# Patient Record
Sex: MALE | Race: WHITE | URBAN - METROPOLITAN AREA
[De-identification: names, ages, dates, MRNs, and addresses within clinical notes are randomized per-mention and may not be internally consistent; named-entity substitution may affect disease eponyms.]

---

## 2017-04-22 ENCOUNTER — APPOINTMENT (OUTPATIENT)
Dept: GENERAL RADIOLOGY | Facility: CLINIC | Age: 47
End: 2017-04-22
Attending: EMERGENCY MEDICINE
Payer: COMMERCIAL

## 2017-04-22 ENCOUNTER — HOSPITAL ENCOUNTER (EMERGENCY)
Facility: CLINIC | Age: 47
Discharge: HOME | End: 2017-04-22
Attending: EMERGENCY MEDICINE | Admitting: EMERGENCY MEDICINE
Payer: COMMERCIAL

## 2017-04-22 VITALS
HEIGHT: 73 IN | DIASTOLIC BLOOD PRESSURE: 97 MMHG | WEIGHT: 190 LBS | RESPIRATION RATE: 16 BRPM | HEART RATE: 102 BPM | TEMPERATURE: 98.3 F | SYSTOLIC BLOOD PRESSURE: 148 MMHG | BODY MASS INDEX: 25.18 KG/M2 | OXYGEN SATURATION: 98 %

## 2017-04-22 DIAGNOSIS — S70.01XA HIP HEMATOMA, RIGHT, INITIAL ENCOUNTER: ICD-10-CM

## 2017-04-22 DIAGNOSIS — S80.811A: ICD-10-CM

## 2017-04-22 DIAGNOSIS — S50.311A ABRASION OF ELBOW, RIGHT, INITIAL ENCOUNTER: ICD-10-CM

## 2017-04-22 PROCEDURE — 99283 EMERGENCY DEPT VISIT LOW MDM: CPT | Mod: 25

## 2017-04-22 PROCEDURE — 90471 IMMUNIZATION ADMIN: CPT

## 2017-04-22 PROCEDURE — 25000132 ZZH RX MED GY IP 250 OP 250 PS 637: Performed by: EMERGENCY MEDICINE

## 2017-04-22 PROCEDURE — 73502 X-RAY EXAM HIP UNI 2-3 VIEWS: CPT

## 2017-04-22 RX ORDER — HYDROCODONE BITARTRATE AND ACETAMINOPHEN 5; 325 MG/1; MG/1
1-2 TABLET ORAL EVERY 4 HOURS PRN
Qty: 15 TABLET | Refills: 0 | Status: SHIPPED | OUTPATIENT
Start: 2017-04-22 | End: 2022-07-21

## 2017-04-22 RX ORDER — HYDROCODONE BITARTRATE AND ACETAMINOPHEN 5; 325 MG/1; MG/1
1-2 TABLET ORAL ONCE
Status: COMPLETED | OUTPATIENT
Start: 2017-04-22 | End: 2017-04-22

## 2017-04-22 RX ORDER — IBUPROFEN 600 MG/1
600 TABLET, FILM COATED ORAL ONCE
Status: DISCONTINUED | OUTPATIENT
Start: 2017-04-22 | End: 2017-04-22

## 2017-04-22 RX ORDER — GINSENG 100 MG
CAPSULE ORAL
Status: DISCONTINUED
Start: 2017-04-22 | End: 2017-04-22 | Stop reason: HOSPADM

## 2017-04-22 RX ADMIN — HYDROCODONE BITARTRATE AND ACETAMINOPHEN 1 TABLET: 5; 325 TABLET ORAL at 18:01

## 2017-04-22 ASSESSMENT — ENCOUNTER SYMPTOMS: WOUND: 1

## 2017-04-22 NOTE — ED AVS SNAPSHOT
[facility]    [address]    [facility] [address]    Phone:  [phone]    Fax:  [phone]                                       Eran Abraham   MRN: [id number]    Department:  [facility]   Date of Visit:  4/22/2017           After Visit Summary Signature Page     I have received my discharge instructions, and my questions have been answered. I have discussed any challenges I see with this plan with the nurse or doctor.    ..........................................................................................................................................  Patient/Patient Representative Signature      ..........................................................................................................................................  Patient Representative Print Name and Relationship to Patient    ..................................................               ................................................  Date                                            Time    ..........................................................................................................................................  Reviewed by Signature/Title    ...................................................              ..............................................  Date                                                            Time
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
## 2017-04-23 NOTE — DISCHARGE INSTRUCTIONS
Soft Tissue Contusion  You have a contusion. This is also called a bruise. There is swelling and some bleeding under the skin. This injury generally takes a few days to a few weeks to heal.  During that time, the bruise will typically change in color from reddish, to purple-blue, to greenish-yellow, then to yellow-brown.  Home care    Elevate the injured area to reduce pain and swelling. As much as possible, sit or lie down with the injured area raised about the level of your heart. This is especially important during the first 48 hours.    Ice the injured area to help reduce pain and swelling. Wrap a cold source (ice pack or ice cubes in a plastic bag) in a thin towel. Apply to the bruised area for 20 minutes every 1 to 2 hours the first day. Continue this 3 to 4 times a day until the pain and swelling goes away.    Unless another medication was prescribed, you can take acetaminophen, ibuprofen, or naproxen to control pain. (If you have chronic liver or kidney disease or ever had a stomach ulcer or GI bleeding, talk with your doctor before using these medicines.)  Follow up  Follow up with your health care provider or our staff as advised. Call if you are not better in 1 to 2 weeks.  When to seek medical advice   Call your health care provider right away if you have any of the following:    Increased pain or swelling    Bruise is on an arm or leg and arm or leg becomes cold, blue, numb or tingly    Signs of infection: Warmth, drainage, or increased redness or pain around the contusion    Inability to move the injured area or body part     Bruise is near your eye and you have problems with your eyesight or eye     Frequent bruising for unknown reasons    6067-2331 [employer]. [address] [place] [address]. All rights reserved. This information is not intended as a substitute for professional medical care. Always follow your healthcare professional's instructions.

## 2017-06-28 ENCOUNTER — MYC MEDICAL ADVICE (OUTPATIENT)
Dept: SLEEP MEDICINE | Facility: CLINIC | Age: 47
End: 2017-06-28

## 2017-06-28 NOTE — TELEPHONE ENCOUNTER
From: Eran Abraham  To: Teddy Wayne MD  Sent: 6/28/2017 7:53 AM CDT  Subject: Do I need an appointment?    Dr. Espinal,    I visited with you in early 2016 and after an in home sleep study was diagnosed with sleep apnea. I began using the prescribed CPAP device at that time. I recently attempted to get replacement accessories for my CPAP machine and was told that insurance would not cover it without an updated prescription. Apparently that is required annually.    I have been using my CPAP consistently, every night and have to say it has been working well for me. My wife agrees. I plan to continue to use it indefinitely.    What is involved in getting an updated prescription? Thanks.    Eran Jeong

## 2017-06-30 ENCOUNTER — OFFICE VISIT (OUTPATIENT)
Dept: SLEEP MEDICINE | Facility: CLINIC | Age: 47
End: 2017-06-30
Payer: COMMERCIAL

## 2017-06-30 VITALS
WEIGHT: 190 LBS | RESPIRATION RATE: 12 BRPM | HEART RATE: 61 BPM | SYSTOLIC BLOOD PRESSURE: 117 MMHG | BODY MASS INDEX: 25.18 KG/M2 | OXYGEN SATURATION: 98 % | DIASTOLIC BLOOD PRESSURE: 67 MMHG | HEIGHT: 73 IN

## 2017-06-30 DIAGNOSIS — G47.33 OBSTRUCTIVE SLEEP APNEA: Primary | ICD-10-CM

## 2017-06-30 PROCEDURE — 99214 OFFICE O/P EST MOD 30 MIN: CPT | Performed by: INTERNAL MEDICINE

## 2017-06-30 NOTE — PATIENT INSTRUCTIONS
Your blood pressure was checked while you were in clinic today.  Please read the guidelines below about what these numbers mean and what you should do about them.  Your systolic blood pressure is the top number.  This is the pressure when the heart is pumping.  Your diastolic blood pressure is the bottom number.  This is the pressure in between beats.  If your systolic blood pressure is less than 120 and your diastolic blood pressure is less than 80, then your blood pressure is normal. There is nothing more that you need to do about it  If your systolic blood pressure is 120-139 or your diastolic blood pressure is 80-89, your blood pressure may be higher than it should be.  You should have your blood pressure re-checked within a year by a primary care provider.  If your systolic blood pressure is 140 or greater or your diastolic blood pressure is 90 or greater, you may have high blood pressure.  High blood pressure is treatable, but if left untreated over time it can put you at risk for heart attack, stroke, or kidney failure.  You should have your blood pressure re-checked by a primary care provider within the next four weeks.

## 2017-06-30 NOTE — NURSING NOTE
"Chief Complaint   Patient presents with     RECHECK     annual pap        Initial /67  Pulse 61  Resp 12  Ht 1.852 m (6' 0.9\")  Wt 86.2 kg (190 lb)  SpO2 98%  BMI 25.14 kg/m2 Estimated body mass index is 25.14 kg/(m^2) as calculated from the following:    Height as of this encounter: 1.852 m (6' 0.9\").    Weight as of this encounter: 86.2 kg (190 lb).  Medication Reconciliation: complete     Tegan Florian CNA,   "

## 2017-06-30 NOTE — PROGRESS NOTES
45 y/o with severe obstructive sleep apnea with apnea/hypopnea index of 44 without supine predominance or hypoxemia. Patient currently has less afternoon sleepiness and easier to arise in morning on aCPAP 5-15 with AHI < 1 and no leaking with 7.6 hours of sleep/CPAP use and 98% of days > 4 hours.       Severe obstructive sleep apnea effectively treated with CPAP with improvement in subjective symptoms.     Total time 25 minutes face to face, greater than 50% counseling and coordination of care outlined above and in educational materials detailed in instructions.

## 2017-06-30 NOTE — MR AVS SNAPSHOT
After Visit Summary   6/30/2017    Eran Abraham    MRN: [id number]           Patient Information     Date Of Birth          1970        Visit Information        Provider Department      6/30/2017 3:30 PM Teddy Wayne MD [facility]        Today's Diagnoses     Obstructive sleep apnea    -  1      Care Instructions    Your blood pressure was checked while you were in clinic today.  Please read the guidelines below about what these numbers mean and what you should do about them.  Your systolic blood pressure is the top number.  This is the pressure when the heart is pumping.  Your diastolic blood pressure is the bottom number.  This is the pressure in between beats.  If your systolic blood pressure is less than 120 and your diastolic blood pressure is less than 80, then your blood pressure is normal. There is nothing more that you need to do about it  If your systolic blood pressure is 120-139 or your diastolic blood pressure is 80-89, your blood pressure may be higher than it should be.  You should have your blood pressure re-checked within a year by a primary care provider.  If your systolic blood pressure is 140 or greater or your diastolic blood pressure is 90 or greater, you may have high blood pressure.  High blood pressure is treatable, but if left untreated over time it can put you at risk for heart attack, stroke, or kidney failure.  You should have your blood pressure re-checked by a primary care provider within the next four weeks.          Follow-ups after your visit        Who to contact     If you have questions or need follow up information about today's clinic visit or your schedule please contact [facility] directly at [phone].  Normal or non-critical lab and imaging results will be communicated to you by MyChart, letter or phone within 4 business days after the clinic has received the results. If you do not hear from us within  "7 days, please contact the clinic through [employer] or phone. If you have a critical or abnormal lab result, we will notify you by phone as soon as possible.  Submit refill requests through [employer]Juice or call your pharmacy and they will forward the refill request to us. Please allow 3 business days for your refill to be completed.          Additional Information About Your Visit        [employer] Information     [employer] gives you secure access to your electronic health record. If you see a primary care provider, you can also send messages to your care team and make appointments. If you have questions, please call your primary care clinic.  If you do not have a primary care provider, please call [phone] and they will assist you.        Care EveryWhere ID     This is your Care EveryWhere ID. This could be used by other organizations to access your [facility] medical records  [id number]        Your Vitals Were     Pulse Respirations Height Pulse Oximetry BMI (Body Mass Index)       61 12 1.852 m (6' 0.9\") 98% 25.14 kg/m2        Blood Pressure from Last 3 Encounters:   06/30/17 117/67   04/22/17 (!) 148/97   03/11/16 117/76    Weight from Last 3 Encounters:   06/30/17 86.2 kg (190 lb)   04/22/17 86.2 kg (190 lb)   03/11/16 86.2 kg (190 lb)              Today, you had the following     No orders found for display       Primary Care Provider    Bfp None       No address on file        Equal Access to Services     [facility]: Hadii aad ku hadasho Soomaali, waaxda luqadaha, qaybta kaalmada adeegyada, analilia munoz . So [facility] [phone].    ATENCIÓN: Si habla español, tiene a resendez disposición servicios gratuitos de asistencia lingüística. Llame al [phone].    We comply with applicable federal civil rights laws and [place] laws. We do not discriminate on the basis of race, color, national origin, age, disability sex, sexual orientation or gender identity.            Thank you!     Thank you " for choosing [facility]  for your care. Our goal is always to provide you with excellent care. Hearing back from our patients is one way we can continue to improve our services. Please take a few minutes to complete the written survey that you may receive in the mail after your visit with us. Thank you!             Your Updated Medication List - Protect others around you: Learn how to safely use, store and throw away your medicines at www.disposemymeds.org.          This list is accurate as of: 6/30/17  3:58 PM.  Always use your most recent med list.                   Brand Name Dispense Instructions for use Diagnosis    calcium polycarbophil 625 MG tablet    FIBERCON     Take 1 tablet by mouth daily        HYDROcodone-acetaminophen 5-325 MG per tablet    NORCO    15 tablet    Take 1-2 tablets by mouth every 4 hours as needed for pain        loratadine 10 MG tablet    CLARITIN     Take 10 mg by mouth daily        multivitamin, therapeutic Tabs tablet      Take 1 tablet by mouth daily        testosterone 5.5 MG/ACT Gel    NATESTO     Spray 10 mg into both nostrils daily

## 2019-09-27 ENCOUNTER — HEALTH MAINTENANCE LETTER (OUTPATIENT)
Age: 49
End: 2019-09-27

## 2021-01-09 ENCOUNTER — HEALTH MAINTENANCE LETTER (OUTPATIENT)
Age: 51
End: 2021-01-09

## 2021-10-23 ENCOUNTER — HEALTH MAINTENANCE LETTER (OUTPATIENT)
Age: 51
End: 2021-10-23

## 2022-02-12 ENCOUNTER — HEALTH MAINTENANCE LETTER (OUTPATIENT)
Age: 52
End: 2022-02-12

## 2022-07-21 ENCOUNTER — OFFICE VISIT (OUTPATIENT)
Dept: SLEEP MEDICINE | Facility: CLINIC | Age: 52
End: 2022-07-21
Payer: COMMERCIAL

## 2022-07-21 VITALS
WEIGHT: 204.2 LBS | HEIGHT: 73 IN | DIASTOLIC BLOOD PRESSURE: 79 MMHG | SYSTOLIC BLOOD PRESSURE: 128 MMHG | RESPIRATION RATE: 16 BRPM | OXYGEN SATURATION: 96 % | BODY MASS INDEX: 27.06 KG/M2 | HEART RATE: 94 BPM

## 2022-07-21 DIAGNOSIS — G47.33 OSA (OBSTRUCTIVE SLEEP APNEA): Primary | ICD-10-CM

## 2022-07-21 PROBLEM — H90.3 BILATERAL SENSORINEURAL HEARING LOSS: Status: ACTIVE | Noted: 2017-10-18

## 2022-07-21 PROCEDURE — 99203 OFFICE O/P NEW LOW 30 MIN: CPT | Performed by: PHYSICIAN ASSISTANT

## 2022-07-21 NOTE — PATIENT INSTRUCTIONS

## 2022-07-21 NOTE — NURSING NOTE
"Chief Complaint   Patient presents with     Sleep Problem     Annual danuta follow up        Initial /79   Pulse 94   Resp 16   Ht 1.854 m (6' 1\")   Wt 92.6 kg (204 lb 3.2 oz)   SpO2 96%   BMI 26.94 kg/m   Estimated body mass index is 26.94 kg/m  as calculated from the following:    Height as of this encounter: 1.854 m (6' 1\").    Weight as of this encounter: 92.6 kg (204 lb 3.2 oz).    Medication Reconciliation: complete    ESS 6  ANABEL 2  Day Gibbs MA      "

## 2022-07-21 NOTE — PROGRESS NOTES
CPAP Follow-Up Visit:    Date on this visit: 7/21/2022    Eran Abraham has a follow-up visit today to review his CPAP use for SHAHNAZ.   PMHx: allergic rhinitis, low testosterone.    Previous Study Results:   HST 3/9/16  apnea/hypopnea index of 44 without supine predominance or hypoxemia. O2 kae 86%. Wt 190#      He has no problems with the CPAP. He uses it nightly.     PAP machine: ResMed. Pressure settings: 5-15 cm. He gets supplies online. DME was [facility]     The compliance data shows that the patient used the CPAP for 30/30 nights, 100% of nights for >4 hours.  The 95th% pressure is 14.2 cm.  The 95th% leak is 22.9 lpm.  The average nightly usage is 8:04.  The average AHI is 0.9/hr.       Interface:  Mask: Nuance Pro nasal pillows mask  Chin strap: No  Leak: No  Using Humidifier: Yes, does not run out  Condensation in hose or mask: No     Difficulties with therapy:    [-] Snoring with CPAP:   [-] Difficulty tolerating the pressure:  [-] Epistaxis/dry nose:   [-] Nasal congestion:  [-] Dry mouth:  [-] Mouth breathing:   [-] Pain/skin breakdown:       Improvements noted with CPAP:  no more sore throat or snoring. Better energy and fewer awakenings.  [+] waking up more refreshed  [+] sleeping better with less arousals  [+] nocturia improved   [+] improved energy level during the day    Weight change since sleep study: 204 lbs, up about 15# in the last 5 years    Bedtime: 10:45-11:15 PM.  Wake time: 6:30-7:30 AM. Falls asleep in 1-2 minutes. Wakes 0 times per night, usually. Lately, he has woken from dreams  Naps: 0 times.       Past medical/surgical history, family history, social history, medications and allergies were reviewed.      Problem List:  Patient Active Problem List    Diagnosis Date Noted     Allergic rhinitis due to pollen 01/29/2016     Priority: Medium     Low testosterone 01/29/2016     Priority: Medium        Impression/Plan:    (G47.33) SHAHNAZ (obstructive sleep apnea)  (primary encounter  diagnosis)  Comment: Eran is doing very well with CPAP. He uses it regularly and benefits from use. He gets supplies online, machine was from [facility].  Plan: Comprehensive DME        Continue auto CPAP 5-15 cm. A prescription was written for new supplies. We reviewed recommendations for cleaning and replacing supplies.        He will follow up with me in about 2 year(s).     19 minutes were spent on the date of the encounter doing chart review, history and exam, documentation and further activities as noted above.     Bennett Goltz, PA-C    CC: No ref. provider found

## 2022-10-09 ENCOUNTER — HEALTH MAINTENANCE LETTER (OUTPATIENT)
Age: 52
End: 2022-10-09

## 2023-03-25 ENCOUNTER — HEALTH MAINTENANCE LETTER (OUTPATIENT)
Age: 53
End: 2023-03-25

## 2023-09-27 ASSESSMENT — SLEEP AND FATIGUE QUESTIONNAIRES
HOW LIKELY ARE YOU TO NOD OFF OR FALL ASLEEP WHILE SITTING AND READING: SLIGHT CHANCE OF DOZING
HOW LIKELY ARE YOU TO NOD OFF OR FALL ASLEEP WHILE SITTING QUIETLY AFTER LUNCH WITHOUT ALCOHOL: SLIGHT CHANCE OF DOZING
HOW LIKELY ARE YOU TO NOD OFF OR FALL ASLEEP WHILE SITTING AND TALKING TO SOMEONE: WOULD NEVER DOZE
HOW LIKELY ARE YOU TO NOD OFF OR FALL ASLEEP WHEN YOU ARE A PASSENGER IN A CAR FOR AN HOUR WITHOUT A BREAK: SLIGHT CHANCE OF DOZING
HOW LIKELY ARE YOU TO NOD OFF OR FALL ASLEEP WHILE LYING DOWN TO REST IN THE AFTERNOON WHEN CIRCUMSTANCES PERMIT: MODERATE CHANCE OF DOZING
HOW LIKELY ARE YOU TO NOD OFF OR FALL ASLEEP WHILE WATCHING TV: WOULD NEVER DOZE
HOW LIKELY ARE YOU TO NOD OFF OR FALL ASLEEP WHILE SITTING INACTIVE IN A PUBLIC PLACE: WOULD NEVER DOZE
HOW LIKELY ARE YOU TO NOD OFF OR FALL ASLEEP IN A CAR, WHILE STOPPED FOR A FEW MINUTES IN TRAFFIC: WOULD NEVER DOZE

## 2023-09-27 NOTE — PROGRESS NOTES
CPAP Follow-Up Visit:    Date on this visit: 9/28/2023    Eran Abraham has a follow-up visit today to review his CPAP use for SHANHAZ.   PMHx: allergic rhinitis, low testosterone.     Previous Study Results:   HST 3/9/16  apnea/hypopnea index of 44 without supine predominance or hypoxemia. O2 kae 86%. Wt 190#     Eran presents to get a prescription for a new machine. His machine is reading a message that the expected motor life has been exceeded. He definitely notices poorer sleep and energy when he can't use CPAP.     PAP machine: ResMed. Pressure settings: 5-15 cm. He gets supplies online. DME was [facility] but he has not used them since getting the CPAP 5 years ago. He has been getting supplies online.        ResMed   Auto-PAP 5 - 15 cmH2O 30 day usage data:    100% of days with > 4 hours of use. 30/30 days with no use.   Average use 7:29 minutes per day.   95%ile Leak 26.7 L/min.   CPAP 95% pressure 14.7 cm.   AHI 0.7 events per hour.            No specialty comments available.    Do you use a CPAP Machine at home: Yes  Overall, on a scale of 0-10 how would you rate your CPAP (0 poor, 10 great): 8    What type of mask do you use: Nasal Pillow Nuance Pro  Is your mask comfortable: Yes  If not, why:      Is your mask leaking: No  If yes, where do you feel it:    How many night per week does the mask leak (0-7):      Do you notice snoring with mask on: No  Do you notice gasping arousals with mask on: No  Are you having significant oral or nasal dryness: No  Is the pressure setting comfortable: Yes  If not, why:    He may occasionally get a little condensation in the mask, seasonal  He may get a new hose yearly and mask parts a lot more often. He has a lot of filters, changes them monthly. He may get a new water chamber every other year. He cleans the mask daily, other parts infrequently.     What is your typical bedtime: 10:30  How long does it take you to go to sleep on PAP therapy: Less than 5 minutes  What  time do you typically get out of bed for the day: 6  How many hours on average per night are you using PAP therapy: 7:30  How many hours are you sleeping per night: 7:30  Do you feel well rested in the morning: Yes  Wakes 1-2 times per night for can be up to 30-60 minutes if his mind races. Reason for waking: babies and work stress (has tried breathing exercises)  Naps: none, but he would like to.       They are raising 2 babies now, so he is pretty exhausted by the end of the day.    Weight change since sleep study: 200 lbs      Past medical/surgical history, family history, social history, medications and allergies were reviewed.      Problem List:  Patient Active Problem List    Diagnosis Date Noted    SHAHNAZ (obstructive sleep apnea) 07/21/2022     Priority: Medium    Bilateral sensorineural hearing loss 10/18/2017     Priority: Medium    Allergic rhinitis due to pollen 01/29/2016     Priority: Medium    Low testosterone 01/29/2016     Priority: Medium    Hypogonadotropic hypogonadism (H) 10/07/2011     Priority: Medium    Allergic rhinitis 06/07/2003     Priority: Medium     Formatting of this note might be different from the original.  Rhinitis Allergic  NOS          Impression/Plan:    (G47.33) SHAHNAZ (obstructive sleep apnea)  (primary encounter diagnosis)  Comment: Eran is using CPAP consistently and greatly benefits from use. He would like to get a prescription for a replacement machine as his current machine reads a message that the  expected motor life has been exceeded and he does not want to get stuck without a machine. He normally gets supplies online.  Plan: Comprehensive DME        Order placed for a replacement auto CPAP with pressures 8-15 cm and new supplies. A prescription was written for new supplies. We reviewed recommendations for cleaning and replacing supplies.       (F51.02) Adjustment insomnia  Comment:  middle of the night awakenings due to work stress and having 2 new babies to care for. His  sleep schedule is consistent.  Plan: We talked about setting aside worry time and discussed a few distraction techniques and stimulus control recommendations.     He will follow up with me in about 2 year(s).     25 minutes were spent on the date of the encounter doing chart review, history and exam, documentation and further activities as noted above.     Bennett Goltz, PA-C    CC: No ref. provider found

## 2023-09-28 ENCOUNTER — OFFICE VISIT (OUTPATIENT)
Dept: SLEEP MEDICINE | Facility: CLINIC | Age: 53
End: 2023-09-28
Payer: COMMERCIAL

## 2023-09-28 VITALS
HEART RATE: 87 BPM | OXYGEN SATURATION: 96 % | SYSTOLIC BLOOD PRESSURE: 122 MMHG | DIASTOLIC BLOOD PRESSURE: 81 MMHG | WEIGHT: 200.4 LBS | BODY MASS INDEX: 26.44 KG/M2

## 2023-09-28 DIAGNOSIS — F51.02 ADJUSTMENT INSOMNIA: ICD-10-CM

## 2023-09-28 DIAGNOSIS — G47.33 OSA (OBSTRUCTIVE SLEEP APNEA): Primary | ICD-10-CM

## 2023-09-28 PROCEDURE — 99213 OFFICE O/P EST LOW 20 MIN: CPT | Performed by: PHYSICIAN ASSISTANT

## 2023-09-28 NOTE — NURSING NOTE
"Chief Complaint   Patient presents with    Sleep Problem     CPAP annual       Initial /81   Pulse 87   Wt 90.9 kg (200 lb 6.4 oz)   SpO2 96%   BMI 26.44 kg/m   Estimated body mass index is 26.44 kg/m  as calculated from the following:    Height as of 7/21/22: 1.854 m (6' 1\").    Weight as of this encounter: 90.9 kg (200 lb 6.4 oz).    Medication Reconciliation: complete    ESS 5    ANABEL 7    [facility]    Austin Thurston CMA (SILVINO)  "

## 2023-09-28 NOTE — PATIENT INSTRUCTIONS
General recommendations for sleep problems (Insomnia)  Allow 2-4 weeks to see results     Establish a regular sleep schedule    Most people only need 7-8 hours of sleep.  Don't be in bed longer than you need     to sleep or you will end up spending more time awake in bed. This trains your    brain to think of the bed as a place to not sleep.  Go to bed at same time each night   Get up at same time each day - Set an alarm everyday (even weekends). This is one of    the most important tips. It prevents you from relying on your insomnia to get you    up on time for your day. That actually reinforces insomnia. It also will help your    body get into a pattern where you start feeling tired at a consistent time each    night.  The body functions best when you keep a consistent routine.  Avoid sleeping-in and napping. Anytime you sleep during the day, you will be less tired at    night. You may be tired enough to fall asleep, but you will wake more in the    middle of the night because you will have met your sleep need before the night is    done.   Cut down time in bed (if not asleep, get up)- Use your bed only for sleep and sex    Anytime you spend time in bed doing activities other than sleep (reading,    watching TV, working, playing on the computer or phone, or even just laying in    bed trying to sleep), you are training  your brain to think of the bed as a place to    do activities other than sleep. If you are not falling asleep within 20-30 minutes,    get out of bed. While out of bed, avoid bright lights. Avoid work or chores. Being    productive in the middle of the night reinforces waking up at night. Find relaxing,    not particularly entertaining activities like reading, listening to music, or relaxation    exercises. Go back to bed if you start feeling groggy, or after about 30 minutes,    even if not feeling very tired. Sometimes, just getting out of bed stops the pattern    of getting frustrated about  laying in bed not sleeping, and that can help you fall    asleep.   Avoid trying to force yourself to sleep- sleep is not like everything else. The harder you    work at most things, the more you can accomplish. The harder you work at    sleep, the less you will sleep.     Make the bedroom comfortable - quiet, dark and cool are better. Consider ear plugs    (silicon). Use dark blinds or wear an eye mask if needed     Make a relaxing routine prior to bedtime  Relaxation exercises:   Progressive muscle relaxation: Relax each muscle group individually    Begin with your feet, flex, then relax. Try to imagine your feet feeling heavy and sinking into the bed. Move to your calves, do the same thing. Work through each muscle group toward your head.    Relaxing Mental Imagery: Try to imagine a trip that you took and found relaxing, or imagine a day at the beach. Try to walk yourself through the day in your mind as if you were dreaming it. Try to imagine sensing the different experiences, such as feeling sand between your toes, the heat of the sun on your skin, seeing the waves crashing the shore, the smell of the salt water, etc.     Deal with your worries before bedtime    Set aside a worry time around dinner time for 10-15 minutes. Write down the    things that are on your mind. Plan time in the coming days to address those    issues. Brainstorm ideas on how you will deal with them. Try to identify issues    that are out of your control, and try to let those issues go.  Listen to relaxation tapes   Classical Music or Nature sounds   Back Massage   Get regular exercise each day (at least 1-2 hours before bedtime)   Take medications only as directed   Eat a light bedtime snack or warm drink   Warm milk   Warm herbal tea (non-caffeinated)       Things to avoid   No overstimulating activities just before bed   No competitive games before bedtime   No exciting television programs before bedtime   Avoid caffeine after lunchtime    Avoid chocolate   Do not use alcohol to induce sleep (worsens Insomnia)   Do not take someone else's sleeping pills   Do not look at the clock when awakening   Do not turn on light when getting up to use bathroom, use a nightlight     Online Programs   www.SHUTi.me (pronounced shut eye). There is a fee for this program. Enter the code  [facility]  if you decide to enroll in this program.    www.sleepIO.com (pronounced sleep ee oh). There is a fee for this program. Enter the code  [facility]  if you decide to enroll in this program.     Suggested Resources  Insomnia Treatment Books   Overcoming Insomnia by Jalen Banks and Nina Alvarez (2008)  No More Sleepless Nights by Conrad Cervantes and Katheryn Vela (1996)  Say Viral to Insomnia by Apollo Willett (2009)  The Insomnia Workbook by Marzena Khan and Shane Matthew (2009)  The Insomnia Answer by Aaron Mcdermott and Edson Jiang (2006)      Stress Management and Relaxation Books  The Relaxation and Stress Reduction Workbook by Elen Chatman, Sheila Burks and Darren Mark (2008)  Stress Management Workbook: Techniques and Self-Assessment Procedures by Leonela Trevino and Willy Ochoa (1997)  A Mindfulness-Based Stress Reduction Workbook by Paul Lo and Jayne Grullon (2010)  The Complete Stress Management Workbook by Carlos Barcenas, Teddy Amanda and Ishaan Montano (1996)  Assert Yourself by Layla Wolfe and Shaun Wolfe (1977)    Relaxation Resources for Computer Download   These websites offer resources to help you relax. This list is for information only. [facility] is not responsible for the quality of services or the actions of any person or organization.  Progressive Muscle Relaxation (PMR):   http://www.innerOzy Mediastudio.com/progressive-muscle-relaxation-exercise.html   http://studentsupport.[facility]/counseling/resources/self-help/relaxation-and-stress-management/   Deep Breathing  Exercises:  http://www.Obvious/breathing-awareness.html     Meditation:   www.[employer]aux  www.theTiendeoguided-meditation-site.com You may have to pay for some of these resources.    Guided Imagery:  http://www.Obvious/guided-imagery-scripts.html   http://Benchling/library/ymcbiiwskh-wlkzqw-lwpfbph/   Consider phone apps such as: Calm, Headspace or Insight Timer.    Counseling / Behavioral Health  [facility]  Visit www.[place].org or call [phone] to find a clinic close to you.  Or call [phone] for [facility].

## 2023-11-02 ENCOUNTER — DOCUMENTATION ONLY (OUTPATIENT)
Dept: SLEEP MEDICINE | Facility: CLINIC | Age: 53
End: 2023-11-02
Payer: COMMERCIAL

## 2023-11-02 DIAGNOSIS — G47.33 OBSTRUCTIVE SLEEP APNEA (ADULT) (PEDIATRIC): Primary | ICD-10-CM

## 2023-11-02 NOTE — PROGRESS NOTES
Patient was offered choice of vendor and chose [facility].  Patient Eran Abraham was set up at [place] on November 2, 2023. Patient received a Resmed Airsense 11 Pressures were set at  8-15 cm H2O.   Patient s ramp is 7 cm H2O for Auto and FLEX/EPR is EPR, 2.  Patient received a Kristen Resp[employer]ics Mask name: NUANCE PRO  Pillow mask size Medium, heated tubing and heated humidifier.  Patient has the following compliance requirements: none  Patient has a follow up on TBD with Bennett Goltz, PA-C.    Geetha Longoria

## 2024-12-21 ENCOUNTER — HEALTH MAINTENANCE LETTER (OUTPATIENT)
Age: 54
End: 2024-12-21